# Patient Record
Sex: MALE | Race: BLACK OR AFRICAN AMERICAN | NOT HISPANIC OR LATINO | Employment: UNEMPLOYED | URBAN - METROPOLITAN AREA
[De-identification: names, ages, dates, MRNs, and addresses within clinical notes are randomized per-mention and may not be internally consistent; named-entity substitution may affect disease eponyms.]

---
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
## 2024-07-29 ENCOUNTER — HOSPITAL ENCOUNTER (EMERGENCY)
Facility: HOSPITAL | Age: 19
Discharge: HOME | End: 2024-07-29
Attending: STUDENT IN AN ORGANIZED HEALTH CARE EDUCATION/TRAINING PROGRAM
Payer: COMMERCIAL

## 2024-07-29 VITALS
HEIGHT: 75 IN | BODY MASS INDEX: 25.49 KG/M2 | RESPIRATION RATE: 20 BRPM | DIASTOLIC BLOOD PRESSURE: 79 MMHG | WEIGHT: 205 LBS | HEART RATE: 81 BPM | SYSTOLIC BLOOD PRESSURE: 137 MMHG | TEMPERATURE: 98 F | OXYGEN SATURATION: 99 %

## 2024-07-29 DIAGNOSIS — R52 PAIN: ICD-10-CM

## 2024-07-29 DIAGNOSIS — S93.401A SPRAIN OF RIGHT ANKLE, UNSPECIFIED LIGAMENT, INITIAL ENCOUNTER: Primary | ICD-10-CM

## 2024-07-29 PROCEDURE — 99283 EMERGENCY DEPT VISIT LOW MDM: CPT | Mod: 25

## 2024-07-29 NOTE — ED PROVIDER NOTES
Encounter Date: 7/29/2024       History     Chief Complaint   Patient presents with    Ankle Injury     Right ankle injury started 2 weeks ago     Patient is a 18-year-old  male no significant past medical history who presented to the ER today due to right ankle pain.  He states that he rolled it with an inversion injury in some boots 2 weeks ago.  He states he has been painful ever since although he has been able to bear weight on it.  He has been taking anti-inflammatories for it but it continues to hurt.  He denies any other injuries any other complaints.      Review of patient's allergies indicates:  No Known Allergies  Past Medical History:   Diagnosis Date    Asthma     Depression      Past Surgical History:   Procedure Laterality Date    COLONOSCOPY  12/29/2022    Dr Claude Pastrana    TONSILLECTOMY, ADENOIDECTOMY  2010     Family History   Problem Relation Name Age of Onset    Heart disease Maternal Grandmother      Heart failure Maternal Grandmother      Hypertension Maternal Grandmother      Hyperlipidemia Maternal Grandmother      Heart disease Maternal Grandfather      Hypertension Maternal Grandfather      Hyperlipidemia Maternal Grandfather      Heart failure Maternal Grandfather       Social History     Tobacco Use    Smoking status: Never     Passive exposure: Never    Smokeless tobacco: Never   Substance Use Topics    Alcohol use: Never    Drug use: Never     Review of Systems   Constitutional:  Negative for chills, fatigue and fever.   HENT:  Negative for congestion, sore throat and trouble swallowing.    Eyes:  Negative for pain and visual disturbance.   Respiratory:  Negative for cough, shortness of breath and wheezing.    Cardiovascular:  Negative for chest pain and palpitations.   Gastrointestinal:  Negative for abdominal pain, blood in stool, constipation, diarrhea, nausea and vomiting.   Genitourinary:  Negative for dysuria and hematuria.   Musculoskeletal:  Positive for  arthralgias. Negative for back pain and myalgias.   Skin:  Negative for rash and wound.   Neurological:  Negative for seizures, syncope and headaches.   Psychiatric/Behavioral:  Negative for confusion. The patient is not nervous/anxious.        Physical Exam     Initial Vitals [07/29/24 1829]   BP Pulse Resp Temp SpO2   (!) 146/84 77 18 98.2 °F (36.8 °C) 98 %      MAP       --         Physical Exam    Nursing note and vitals reviewed.  Constitutional: He appears well-developed and well-nourished. No distress.   HENT:   Head: Normocephalic and atraumatic.   Eyes: Conjunctivae and EOM are normal. Right eye exhibits no discharge. Left eye exhibits no discharge. No scleral icterus.   Neck: No tracheal deviation present.   Normal range of motion.  Cardiovascular:  Normal rate, regular rhythm and normal heart sounds.     Exam reveals no gallop and no friction rub.       No murmur heard.  Pulmonary/Chest: Breath sounds normal. No respiratory distress. He has no wheezes. He has no rhonchi. He has no rales.   Musculoskeletal:         General: No edema. Normal range of motion.      Cervical back: Normal range of motion.      Comments: Right ankle:  Tenderness palpation of the distal fibula with no obvious ecchymosis or deformity.  DP pulses appreciated.  No lower extremity edema.     Neurological: He is alert.   Skin: Skin is warm and dry. No rash and no abscess noted. No erythema. No pallor.   Psychiatric: His behavior is normal. Judgment normal.         ED Course   Procedures  Labs Reviewed - No data to display       Imaging Results              X-Ray Ankle Complete Right (Final result)  Result time 07/29/24 18:44:52      Final result by Lester Selby MD (07/29/24 18:44:52)                   Impression:      No acute osseous abnormality identified.      Electronically signed by: Lester Selby  Date:    07/29/2024  Time:    18:44               Narrative:    EXAMINATION:  XR ANKLE COMPLETE 3 VIEW RIGHT    CLINICAL  HISTORY:  Pain, unspecified    TECHNIQUE:  Three views    COMPARISON:  None available.    FINDINGS:  Osseous and articular surfaces are unremarkable.  No acute fracture, dislocation or arthritic change.                                       Medications - No data to display  Medical Decision Making  Differentials: Fracture, dislocation, sprain   History in his the patient   18-year-old well-appearing male presents with ankle pain after an inversion injury.  X-ray shows no acute process.  Ankle sprain leads the differential an Ace wrap applied.  Rice therapy advise weightbear as tolerated discussed crutches provided.  All questions answered in layman's terms and return precautions were discussed.    Amount and/or Complexity of Data Reviewed  Radiology: ordered. Decision-making details documented in ED Course.                                      Clinical Impression:  Final diagnoses:  [R52] Pain  [S93.401A] Sprain of right ankle, unspecified ligament, initial encounter (Primary)          ED Disposition Condition    Discharge Stable          ED Prescriptions    None       Follow-up Information       Follow up With Specialties Details Why Contact Info    [facility] - Emergency Dept Emergency Medicine  If symptoms worsen [address] [facility] [address] [phone]    Gali Zamora MD Family Medicine, Urgent Care Schedule an appointment as soon as possible for a visit   [address] [place] [id number]  [phone]               Claude Garrison MD  07/29/24 3801

## 2024-07-29 NOTE — Clinical Note
"Chong"Maribel Austin was seen and treated in our emergency department on 7/29/2024.  He may return to work on 08/02/2024.       If you have any questions or concerns, please don't hesitate to call.      Claude Garrison MD"

## 2024-11-18 ENCOUNTER — PATIENT MESSAGE (OUTPATIENT)
Dept: FAMILY MEDICINE | Facility: CLINIC | Age: 19
End: 2024-11-18
Payer: COMMERCIAL

## 2024-11-26 ENCOUNTER — OFFICE VISIT (OUTPATIENT)
Dept: FAMILY MEDICINE | Facility: CLINIC | Age: 19
End: 2024-11-26
Payer: COMMERCIAL

## 2024-11-26 VITALS
RESPIRATION RATE: 18 BRPM | WEIGHT: 197.31 LBS | DIASTOLIC BLOOD PRESSURE: 75 MMHG | SYSTOLIC BLOOD PRESSURE: 108 MMHG | HEART RATE: 63 BPM | BODY MASS INDEX: 24.53 KG/M2 | HEIGHT: 75 IN | TEMPERATURE: 98 F | OXYGEN SATURATION: 98 %

## 2024-11-26 DIAGNOSIS — F17.290 VAPING NICOTINE DEPENDENCE, TOBACCO PRODUCT: ICD-10-CM

## 2024-11-26 DIAGNOSIS — F31.9 BIPOLAR DISORDER WITH DEPRESSION: ICD-10-CM

## 2024-11-26 DIAGNOSIS — Z00.00 ENCOUNTER FOR WELLNESS EXAMINATION: Primary | ICD-10-CM

## 2024-11-26 RX ORDER — ESCITALOPRAM OXALATE 10 MG/1
10 TABLET ORAL DAILY
Qty: 30 TABLET | Refills: 1 | Status: SHIPPED | OUTPATIENT
Start: 2024-11-26 | End: 2025-11-26

## 2024-11-26 NOTE — PROGRESS NOTES
Chong Austin  11/26/2024  [id number]    Gali Zamora MD  Patient Care Team:  Gali Zamora MD as PCP - General (Family Medicine)  Claude Pastrana MD as Consulting Physician (Gastroenterology)      Chief Complaint:  Chief Complaint   Patient presents with    Anxiety     Pt is c/o anxiety       History of Present Illness:    18 y.o. male who presents today    Review of Systems  General: denies f/c, weight loss, night sweats, decreased appetite  Eye: denies blurred vision, changes in vision  Respiratory: denies sob, wheezing, cough  Cardiovascular: denies chest pain, palpitations, edema  Gastrointestinal: denies abdominal pain, n/v, constipation, diarrhea  Integumentary: denies rashes, pruritis    Past Medical History  Past Medical History:   Diagnosis Date    Anxiety     Asthma     Depression        Medications  Medication List with Changes/Refills   Current Medications    ARIPIPRAZOLE (ABILIFY) 5 MG TAB    Take 5 mg by mouth every evening.    COLESTIPOL (COLESTID) 1 GRAM TAB    Take 1 g by mouth every morning.    ESCITALOPRAM OXALATE (LEXAPRO) 20 MG TABLET    TAKE 1 TABLET BY MOUTH EVERY DAY    LORATADINE (CLARITIN) 10 MG TABLET    TAKE 1 TABLET BY MOUTH EVERY DAY    OXCARBAZEPINE (TRILEPTAL) 300 MG TAB    Take 1 tablet (300 mg total) by mouth once daily.    TRAZODONE (DESYREL) 100 MG TABLET    Take  mg by mouth every evening.       Past Surgical History:   Procedure Laterality Date    COLONOSCOPY  12/29/2022    Dr Claude Pastrana    TONSILLECTOMY, ADENOIDECTOMY  2010       SUBJECTIVE:  Health Maintenance  Health Maintenance Topics with due status: Not Due       Topic Last Completion Date    TETANUS VACCINE 12/20/2021    RSV Vaccine (Age 60+ and Pregnant patients) Not Due     Health Maintenance Due   Topic Date Due    Pneumococcal Vaccines (Age 0-64) (1 of 2 - PCV) 12/01/2010    HIV Screening  Never done    COVID-19 Vaccine (3 - 2024-25 season) 09/01/2024       Exam:  Vitals:    11/26/24  "0945   BP: 108/75   BP Location: Left arm   Patient Position: Sitting   Pulse: 63   Resp: 18   Temp: 97.8 °F (36.6 °C)   TempSrc: Oral   SpO2: 98%   Weight: 89.5 kg (197 lb 4.8 oz)   Height: 6' 3" (1.905 m)     Weight: 89.5 kg (197 lb 4.8 oz)   Body mass index is 24.66 kg/m².      Physical Exam  Constitutional: NAD, alert, pleasant  Respiratory: CTAB, no wheezes, rales or rhonchi. No accessory muscle use  Eyes: EOMI  Cardiovascular: RRR, No m/r/g. No JVD. No LE edema  Gastrointestinal: BS+, nontender, nondistended  Integumentary: warm, dry, intact  Psych: AA&Ox3      ICD-10-CM ICD-9-CM   1. Bipolar disorder with depression  F31.9 296.50       1. Bipolar disorder with depression  Overview:  Sees psych. Has not cut in a while. Denies si/hi.    Continue current Rx meds             Follow up: No follow-ups on file.      Care Plan/Goals: Reviewed   Goals    None               "